# Patient Record
Sex: FEMALE | Race: WHITE
[De-identification: names, ages, dates, MRNs, and addresses within clinical notes are randomized per-mention and may not be internally consistent; named-entity substitution may affect disease eponyms.]

---

## 2017-03-30 ENCOUNTER — HOSPITAL ENCOUNTER (INPATIENT)
Dept: HOSPITAL 80 - CED | Age: 77
LOS: 2 days | Discharge: HOME | DRG: 247 | End: 2017-04-01
Attending: INTERNAL MEDICINE | Admitting: INTERNAL MEDICINE
Payer: COMMERCIAL

## 2017-03-30 DIAGNOSIS — Z85.3: ICD-10-CM

## 2017-03-30 DIAGNOSIS — Z86.73: ICD-10-CM

## 2017-03-30 DIAGNOSIS — I10: ICD-10-CM

## 2017-03-30 DIAGNOSIS — F41.8: ICD-10-CM

## 2017-03-30 DIAGNOSIS — I25.790: Primary | ICD-10-CM

## 2017-03-30 DIAGNOSIS — I25.2: ICD-10-CM

## 2017-03-30 DIAGNOSIS — I48.0: ICD-10-CM

## 2017-03-30 DIAGNOSIS — K21.9: ICD-10-CM

## 2017-03-30 DIAGNOSIS — I48.92: ICD-10-CM

## 2017-03-30 DIAGNOSIS — E78.5: ICD-10-CM

## 2017-03-30 LAB
% IMMATURE GRANULYOCYTES: 0.3 % (ref 0–1.1)
ABSOLUTE IMMATURE GRANULOCYTES: 0.02 10^3/UL (ref 0–0.1)
ABSOLUTE NRBC COUNT: 0 10^3/UL (ref 0–0.01)
ADD DIFF?: NO
ADD MORPH?: NO
ADD SCAN?: NO
ALBUMIN SERPL-MCNC: 3.9 G/DL (ref 3.5–5)
ALP SERPL-CCNC: 79 IU/L (ref 38–126)
ALT SERPL-CCNC: 34 IU/L (ref 9–52)
ANION GAP SERPL CALC-SCNC: 13 MEQ/L (ref 8–16)
AST SERPL-CCNC: 24 IU/L (ref 14–46)
ATYPICAL LYMPHOCYTE FLAG: 0 (ref 0–99)
BILIRUB SERPL-MCNC: 0.4 MG/DL (ref 0.1–1.4)
CALCIUM SERPL-MCNC: 9.1 MG/DL (ref 8.5–10.4)
CHLORIDE SERPL-SCNC: 104 MEQ/L (ref 97–110)
CO2 SERPL-SCNC: 22 MEQ/L (ref 22–31)
CREAT SERPL-MCNC: 0.8 MG/DL (ref 0.6–1)
ERYTHROCYTE [DISTWIDTH] IN BLOOD BY AUTOMATED COUNT: 13.5 % (ref 11.5–15.2)
FRAGMENT RBC FLAG: 0 (ref 0–99)
GFR SERPL CREATININE-BSD FRML MDRD: > 60 ML/MIN/{1.73_M2}
GLUCOSE SERPL-MCNC: 86 MG/DL (ref 70–100)
HCT VFR BLD CALC: 38.9 % (ref 38–47)
HGB BLD-MCNC: 13.3 G/DL (ref 12.6–16.3)
LEFT SHIFT FLG: 0 (ref 0–99)
LIPEMIA HEMOLYSIS FLAG: 90 (ref 0–99)
MCH RBC BLDCO QN: 32.7 PG (ref 27.9–34.1)
MCHC RBC AUTO-ENTMCNC: 34.2 G/DL (ref 32.4–36.7)
MCV RBC AUTO: 95.6 FL (ref 81.5–99.8)
NRBC-AUTO%: 0 % (ref 0–0.2)
PLATELET # BLD: 261 10^3/UL (ref 150–400)
PLATELET CLUMPS FLAG: 0 (ref 0–99)
PMV BLD AUTO: 10.4 FL (ref 8.7–11.7)
POTASSIUM SERPL-SCNC: 4.2 MEQ/L (ref 3.5–5.2)
PROT SERPL-MCNC: 7.1 G/DL (ref 6.3–8.2)
RBC # BLD AUTO: 4.07 10^6/UL (ref 4.18–5.33)
SODIUM SERPL-SCNC: 139 MEQ/L (ref 134–144)
TROPONIN I SERPL-MCNC: < 0.012 NG/ML (ref 0–0.03)

## 2017-03-30 PROCEDURE — C1725 CATH, TRANSLUMIN NON-LASER: HCPCS

## 2017-03-30 PROCEDURE — C1760 CLOSURE DEV, VASC: HCPCS

## 2017-03-30 PROCEDURE — C9600 PERC DRUG-EL COR STENT SING: HCPCS

## 2017-03-30 PROCEDURE — C1887 CATHETER, GUIDING: HCPCS

## 2017-03-30 PROCEDURE — C1769 GUIDE WIRE: HCPCS

## 2017-03-30 PROCEDURE — C1874 STENT, COATED/COV W/DEL SYS: HCPCS

## 2017-03-30 RX ADMIN — ASPIRIN SCH MG: 81 TABLET, DELAYED RELEASE ORAL at 21:34

## 2017-03-30 RX ADMIN — ATORVASTATIN CALCIUM SCH MG: 40 TABLET, FILM COATED ORAL at 21:34

## 2017-03-30 RX ADMIN — Medication SCH MG: at 21:32

## 2017-03-30 RX ADMIN — CARVEDILOL SCH MG: 6.25 TABLET, FILM COATED ORAL at 21:33

## 2017-03-30 RX ADMIN — RAMIPRIL SCH MG: 2.5 CAPSULE ORAL at 21:34

## 2017-03-30 NOTE — CPEKG
Heart Rate: 66

RR Interval: 909

P-R Interval: 175

QRSD Interval: 82

QT Interval: 392

QTC Interval: 411

P Axis: -25

QRS Axis: 33

T Wave Axis: 21

EKG Severity - ABNORMAL ECG -

EKG Impression: SINUS RHYTHM

EKG Impression: ANTERIOR INFARCT, OLD

Electronically Signed By: Rodrigo Tang 30-Mar-2017 14:58:52

## 2017-03-30 NOTE — EDPHY
H & P


Chief Complaint Nursing Narrative: intermittent chest discomfort described as " 

indigestion" with l shoulder pain that radiates to neck and sob x 1 week


Time Seen by Provider: 03/30/17 14:09


HPI/ROS: 





Chief Complaint:  Chest pain





HPI:  76-year-old woman with past medical history of coronary artery disease, 

CVA and TIA is presenting complaining of "indigestion" for the last week and a 

half.  Patient states she has been having daily discomfort in her chest which 

is radiating to her left arm and to her left neck.  She states this does come 

and go and goes away with " a large amount" of antacids.  It does feel somewhat 

similar to when she had her prior MI.  Has not discussed this with her 

cardiologist, Dr. Mccray, or her primary care physician, Dr. Camilla Smith.  It is 

waxing waning.  At worst it is a 4/10.  If your Urgent Care today it is 0/10.  

She has had some nausea with no vomiting. No numbness or weakness. Has had some 

dyspnea on exertion.





ROS:  10 point Review of Systems is negative except as noted in the HPI.





PMH:  Coronary artery disease status post CABG, CVA x1, TIA x1, breast cancer 

status post lumpectomy, lymphoma





Medications:  Carvedilol, digoxin, Lipitor, omeprazole, aspirin, Wellbutrin, 

ramipril, Klonopin





Allergies:  Sulfa





Social History: No smoking, no alcohol,  no recreational drug use





Family History: non-contributory





Physical Exam:


Gen: Awake, Alert, No Distress


HEENT:  


     Nose: no rhinorrhea


     Eyes: PERRLA, EOMI


     Mouth: Moist mucosa 


Neck: Supple, no JVD


Chest: nontender, lungs clear to auscultation


Heart: S1, S2 normal, no murmur


Abd: Soft, non-tender, no guarding


Back: no CVA tenderness, no midline tenderness 


Ext: no edema, non-tender


Skin: no rash


Neuro: CN II-XII intact, Sensation grossly intact, Strength 5/5 in bilateral 

upper and lower extremities








- Personal History


Tetanus Vaccine Date: < 10





- Medical/Surgical History


Hx Asthma: No


Hx Chronic Respiratory Disease: No


Hx Diabetes: No


Hx Cardiac Disease: Yes


Hx Renal Disease: No


Hx Cirrhosis: No


Hx Alcoholism: No


Hx HIV/AIDS: No


Hx Splenectomy or Spleen Trauma: No


Other PMH: cardiac issues





- Social History


Smoking Status: Never smoked


Constitutional: 


 Initial Vital Signs











Temperature (C)  37.1 C   03/30/17 14:10


 


Heart Rate  70   03/30/17 14:10


 


Respiratory Rate  18   03/30/17 14:10


 


Blood Pressure  145/83 H  03/30/17 14:10


 


O2 Sat (%)  92   03/30/17 14:10








 











O2 Delivery Mode               Room Air














Allergies/Adverse Reactions: 


 





Sulfa (Sulfonamide Antibiotics) Allergy (Intermediate, Verified 05/29/14 12:10)


 








Home Medications: 














 Medication  Instructions  Recorded


 


Aspirin [Aspirin 81mg (OTC)] 81 mg PO DAILY 04/14/12


 


Carvedilol [Coreg (RX)] 3.125 mg PO BIDMEAL 04/14/12


 


Ramipril [Altace 2.5mg (RX)] 2.5 mg PO HS 04/14/12


 


clonazePAM [Klonopin (RX)] 0.5 mg PO HS 04/14/12


 


Atorvastatin Calcium [Lipitor 40 40 mg PO DAILY 11/11/13





mg (RX)]  


 


Bupropion HCl [Budeprion Sr] 300 mg PO HS 11/11/13


 


Digoxin [Lanoxin] 0.125 mg PO DAILY 11/11/13


 


Melatonin [Melatonin 3 MG (OTC)] 3 mg PO HS 11/11/13


 


Omeprazole [Prilosec 20 mg] 20 mg PO DAILY PRN 11/11/13














Medical Decision Making





- Diagnostics


EKG Interpretation: 





ECG:  Time 2:14 p.m., sinus rhythm with a rate of 66, normal axis, Q-waves in 

V1 through V3 consistent with an old anterior infarct, no acute ST or T-wave 

changes.  Impression:  Old infarct, no acute


Imaging: 





Chest x-ray:  Negative per Dr. Oppenheimer.


ED Course/Re-evaluation: 





76-year-old with multiple risk factors for coronary disease with a history of 

CABG in the past.  Patient has no acute changes on ECG today troponin is 0. 

However given her significant risk factors in inability to recognize the 

possibility of this as cardiac she will require admit hospital admission for 

further evaluation.  I have discussed with Dr. Solano, hospitalist.  She will 

except the patient to the PCU for further evaluation.





- Data Points


Laboratory Results: 


 Laboratory Results





 03/30/17 14:30 





 03/30/17 14:30 





 











  03/30/17 03/30/17





  14:30 14:30


 


WBC    6.73 10^3/uL 10^3/uL





    (3.80-9.50) 


 


RBC    4.07 10^6/uL L 10^6/uL





    (4.18-5.33) 


 


Hgb    13.3 g/dL g/dL





    (12.6-16.3) 


 


Hct    38.9 % %





    (38.0-47.0) 


 


MCV    95.6 fL fL





    (81.5-99.8) 


 


MCH    32.7 pg pg





    (27.9-34.1) 


 


MCHC    34.2 g/dL g/dL





    (32.4-36.7) 


 


RDW    13.5 % %





    (11.5-15.2) 


 


Plt Count    261 10^3/uL 10^3/uL





    (150-400) 


 


MPV    10.4 fL fL





    (8.7-11.7) 


 


Neut % (Auto)    60.3 % %





    (39.3-74.2) 


 


Lymph % (Auto)    24.4 % %





    (15.0-45.0) 


 


Mono % (Auto)    12.2 % %





    (4.5-13.0) 


 


Eos % (Auto)    2.4 % %





    (0.6-7.6) 


 


Baso % (Auto)    0.4 % %





    (0.3-1.7) 


 


Nucleat RBC Rel Count    0.0 % %





    (0.0-0.2) 


 


Absolute Neuts (auto)    4.06 10^3/uL 10^3/uL





    (1.70-6.50) 


 


Absolute Lymphs (auto)    1.64 10^3/uL 10^3/uL





    (1.00-3.00) 


 


Absolute Monos (auto)    0.82 10^3/uL H 10^3/uL





    (0.30-0.80) 


 


Absolute Eos (auto)    0.16 10^3/uL 10^3/uL





    (0.03-0.40) 


 


Absolute Basos (auto)    0.03 10^3/uL 10^3/uL





    (0.02-0.10) 


 


Absolute Nucleated RBC    0.00 10^3/uL 10^3/uL





    (0-0.01) 


 


Immature Gran %    0.3 % %





    (0.0-1.1) 


 


Immature Gran #    0.02 10^3/uL 10^3/uL





    (0.00-0.10) 


 


Sodium  139 mEq/L mEq/L  





   (134-144)  


 


Potassium  4.2 mEq/L mEq/L  





   (3.5-5.2)  


 


Chloride  104 mEq/L mEq/L  





   ()  


 


Carbon Dioxide  22 mEq/l mEq/l  





   (22-31)  


 


Anion Gap  13 mEq/L mEq/L  





   (8-16)  


 


BUN  13 mg/dL mg/dL  





   (7-23)  


 


Creatinine  0.8 mg/dL mg/dL  





   (0.6-1.0)  


 


Estimated GFR  > 60   





   


 


Glucose  86 mg/dL mg/dL  





   ()  


 


Calcium  9.1 mg/dL mg/dL  





   (8.5-10.4)  


 


Total Bilirubin  0.4 mg/dL mg/dL  





   (0.1-1.4)  


 


AST  24 IU/L IU/L  





   (14-46)  


 


ALT  34 IU/L IU/L  





   (9-52)  


 


Alkaline Phosphatase  79 IU/L IU/L  





   ()  


 


Troponin I  < 0.012 ng/mL ng/mL  





   (0-0.034)  


 


Total Protein  7.1 g/dL g/dL  





   (6.3-8.2)  


 


Albumin  3.9 g/dL g/dL  





   (3.5-5.0)  














Departure





- Departure


Disposition: [facility] Inpatient Acute


Clinical Impression: 


 Chest pain





Condition: Fair


Referrals: 


Camilla Leyva MD [Primary Care Provider] - As per Instructions

## 2017-03-31 PROCEDURE — 027034Z DILATION OF CORONARY ARTERY, ONE ARTERY WITH DRUG-ELUTING INTRALUMINAL DEVICE, PERCUTANEOUS APPROACH: ICD-10-PCS | Performed by: INTERNAL MEDICINE

## 2017-03-31 RX ADMIN — ASPIRIN SCH MG: 81 TABLET, DELAYED RELEASE ORAL at 08:40

## 2017-03-31 RX ADMIN — RAMIPRIL SCH MG: 2.5 CAPSULE ORAL at 20:25

## 2017-03-31 RX ADMIN — NITROGLYCERIN SCH: 20 OINTMENT TOPICAL at 23:45

## 2017-03-31 RX ADMIN — THERA TABS SCH EACH: TAB at 08:39

## 2017-03-31 RX ADMIN — NITROGLYCERIN SCH: 20 OINTMENT TOPICAL at 02:40

## 2017-03-31 RX ADMIN — NITROGLYCERIN SCH INCH: 20 OINTMENT TOPICAL at 05:45

## 2017-03-31 RX ADMIN — NITROGLYCERIN SCH INCH: 20 OINTMENT TOPICAL at 17:17

## 2017-03-31 RX ADMIN — NITROGLYCERIN SCH: 20 OINTMENT TOPICAL at 18:17

## 2017-03-31 RX ADMIN — ATORVASTATIN CALCIUM SCH MG: 40 TABLET, FILM COATED ORAL at 08:40

## 2017-03-31 RX ADMIN — Medication SCH MG: at 20:25

## 2017-03-31 RX ADMIN — ENOXAPARIN SODIUM SCH MG: 100 INJECTION SUBCUTANEOUS at 08:38

## 2017-03-31 NOTE — HOSPPROG
Hospitalist Progress Note


Assessment/Plan: 





DIAGNOSES: 


-unstable angina pectoralis


-coronary artery stenosis at obtuse marginal branch, now SPECT status post 

stent which is her 1st stent


-history of bypass surgery 1994, had presented with chest pain and cardiac 

arrest at that time


-no evidence of DVT on sono





PLANS:


-monitor overnight for stability; therefore will need to change to inpt


-Plavix will be added


-anticipate likely discharge tomorrow





SUBJECTIVE:


Seen after angiography and stent placement


Feels well with no pain at access site, no angina, no shortness of breath or 

nausea





OBJECTIVE


Vitals reviewed:  stable without fever


Cardiac Monitor, my review: sinus rhythm





Exam:


alert oriented 


skin warm dry color ok


resps not labored


lungs clear BSs


heart regular


abd soft nondistended nontender, bowel sounds present


limbs warm, no edema


iv site ok








Objective: 


 Vital Signs











Temp Pulse Resp BP Pulse Ox


 


 37.0 C   68   13   158/97 H  94 


 


 03/31/17 15:49  03/31/17 15:49  03/31/17 15:49  03/31/17 15:49  03/31/17 15:49








 











 03/30/17 03/31/17 04/01/17





 06:59 06:59 06:59


 


Intake Total  300 


 


Balance  300 














ICD10 Worksheet


Patient Problems: 


 Problems











Problem Status Onset


 


Chest pain Acute  


 


TIA (transient ischemic attack) Acute

## 2017-03-31 NOTE — CPEKG
Heart Rate: 63

RR Interval: 952

P-R Interval: 248

QRSD Interval: 82

QT Interval: 404

QTC Interval: 414

P Axis: 87

QRS Axis: 55

T Wave Axis: 25

EKG Severity - ABNORMAL ECG -

EKG Impression: SINUS RHYTHM

EKG Impression: FIRST DEGREE AV BLOCK

EKG Impression: LOW VOLTAGE THROUGHOUT

EKG Impression: CONSIDER ANTEROSEPTAL INFARCT

EKG Impression: No significant change from March 31, 2017, 8:59

Electronically Signed By: Inder Wheat 31-Mar-2017 16:42:24

## 2017-03-31 NOTE — PDGENHP
History and Physical


History and Physical: 


HISTORY AND PHYSICAL





CC:  Chest pain





HISTORY:


This patient with a history of bypass surgery in 1994 comes in complaining of 

anginal symptoms.  She states that over the last 10 days she has had frequent 

episodes of a squeezing discomfort in the substernal area radiating up to the 

left neck and shoulder.  She describes this as an indigestion.  This is the 

same location and quality of pain that she recalls from 1994 and it sounds very 

much like description of her symptoms in her chart from that time.  She does 

not notice that exertion eating position or anything else in particular is 

bringing on these episodes.  The episodes are lasting 5-10 minutes and 

occurring at least once a day.  There is some nausea and mild dyspnea that goes 

with that.  Otherwise she does not have any new exertional dyspnea, orthopnea, 

palpitations, fever.  She does notice that her calfs are both a bit sore.





She is taking her cholesterol treatment, she does not smoke, she has not 

traveled.  She is quite sedentary though.








ROS:  A comprehensive 10 system review revealed no other significant findings








PAST MEDICAL HISTORY:


Coronary artery bypass surgery 1994


Acute myocardial infarction with cardiac arrest in 1994


Hypertension


Anxiety disorder


Breast cancer with lumpectomy and radiation





FAMILY MEDICAL HISTORY:


Coronary disease in both her parents with myocardial infarctions





SOCIAL HISTORY:


 lives with her 


They apparently run some type of  for  children and they have 5 

kids who are present during the week


She admits to a severe degree of stress lately.  Apparently she has an adopted 

14-year-old son who has run from home and apparently is making some kind of 

suicidal threats recently





MEDICATIONS:


The patient's home medication list has been reconciled by our clinical 

pharmacist in the EMR.  I have reviewed the list and ordered appropriate 

medicines.





PHYSICAL EXAMINATION:


Vital Signs:  Stable without fever


Cardiac Monitor:Sinus





Examination:


General: alert, oriented, good mentation, relaxed


Skin: warm, dry, good color, no rash


HEENT: normal


Neck: no mass or jvd


Resps: relaxed


Lungs: clear breath sounds


Heart: regular, no murmur


Abdomen: soft, nondistended, nontender, +BS, no mass


Upper Extremities: normal


Lower Extremities: no edema, warm


No Bleeding or bruising


Neurologic: normal speech/language, normal CNs, no focal weakness


IV site: looks normal








LABORATORY DATA:


1st troponin normal otherwise unremarkable





12 LEAD EKG, MY PERSONAL READING OF THE TRACING:  Sinus rhythm with no acute 

ischemic abnormalities





RADIOLOGY STUDIES:


My personal interpretation:  Status post mastectomy right otherwise unremarkable





ASSESSMENT:


-UNSTABLE ANGINA PECTORALIS IN A PATIENT WITH BYPASS SURGERY AND CARDIAC ARREST 

IN 1994, HISTORY OF RADIATION FOR BREAST CANCER


-BILATERAL CALF DISCOMFORT WITH UNREMARKABLE EXAMINATION





PLANS:


-admit to cardiac monitor unit, NPO after midnight


-continue her usual medicines


-will give some nitroglycerin overnight to keep things quiet


-coronary angiography in the morning


-Will hold her digoxin at this point as there does not appear to be any reason 

for her to be taking that, and Dr. Recinos is recommending that we discontinue 

that as she goes home


-Doppler ultrasound of both legs








I have reviewed the patient's case in detail with Dr. devang Recinos 

and Ricky Wong


I have reviewed the patient's past medical records as part of this assessment, 

including previous hospital records from this hospital and clinic records

## 2017-03-31 NOTE — GCON
[f rep st]



                                                                    CONSULTATION





CARDIOLOGY CONSULTATION



DATE OF CONSULTATION:  03/31/2017



CHIEF COMPLAINT:  Intermittent chest and shoulder pain.



HISTORY OF PRESENT ILLNESS:  This is a very pleasant 76-year-old female who is followed by Dr. Andreina garnica in our office.  Her cardiac history dates back to 1994 when she had a cardiac arrest and underwent
 apparently 2-vessel coronary bypass grafting by Dr. Bebo Wallace.  She was being seen by a cardio
logist in [place].  Apparently had a "normal exercise stress test within the last year."  For the las
t 2 weeks, however, she has been having increasing indigestion, which radiates to her neck and left 
shoulder pain.  Her initial presentation of coronary artery disease was thought to be neck and shoul
shirley pain as well.  She is active.  She is under quite a bit of stress at her house.  She has been ta
yefri antacids with some relief.  She denies any active GI blood loss.  She was admitted last night a
nd has had EKGs, which were nondiagnostic, and normal troponins.  She is pain-free and doing well th
is morning.  I had a long talk with her and her  about her options of cardiac catheterization
 versus nuclear stress testing.  We went through the pros and cons.  At this point, she has agreed t
o cardiac catheterization.  She had no prior issues with her cardiac catheterization, such as allerg
ies or other issues.  She does understand that this will be the test that will give us the most defi
nitive information, and if need be, stenting can be done at the same time.  She is comfortable and a
greeable to that.



PAST MEDICAL HISTORY:  Coronary artery disease, history of TIAs, status post breast cancer and lumpe
ctomy.



MEDICATIONS:  Include carvedilol, digoxin, Lipitor, omeprazole, aspirin, Wellbutrin, ramipril, and K
lonopin.



ALLERGIES:  Sulfa.



SOCIAL HISTORY:  No alcohol, no cigarettes.  She does have quite a bit of stress with caring for a f
ju child.



EXAM:  VITAL SIGNS:  Blood pressure is 100/70, with a heart rate of 70. GENERAL:  She is a middle-ag
ed, elderly female who is comfortable lying in bed.  MOUTH:  Oropharynx was moist.  BACK, CV, CHEST:
  All without palpable tenderness.  LUNGS:  Clear to auscultation.  CARDIOVASCULAR:  Regular rate an
d rhythm, without murmurs, gallops, or rub.  ABDOMEN:  Soft, nontender.  Normoactive bowel sounds.  
MUSCULOSKELETAL:  Without cyanosis, clubbing, or edema.



LABS:  Potassium 4.2, creatinine 0.8.  Troponin is normal.  White count 6.7, hemoglobin 13.  EKG jame
ws a normal sinus rhythm, without acute ST-T wave changes.



ASSESSMENT:  

1.  Two-week history of increasing chest discomfort with radiation to the neck and jaw.  This very w
ell could be GI; however, she has significant cardiac history in the past, including cardiac arrest.
  She may have had a normal treadmill within the last year, but these symptoms are new.  She is hemo
dynamically stable, with negative EKG and enzymes and a normal exam today.  She has agreed to cardia
c catheterization intervention if necessary.  This will be done this morning.  Hopefully, if this sh
ows no issues, she could be discharged later today for GI outpatient followup.  If she needs a stent
, she has agreed to this.  Her , who is in the exam room, was spoken to as well, and he agree
s.  In reviewing her meds, she has been on digitalis for quite some time of unknown reasons, and I m
ay very well stop that drug as well.  

2.  Gastroesophageal reflux disease.  No active gastrointestinal bleeding.  

3.  Life stressors.  Could be a contributing factor.  



Case discussed with Dr. Cochran.





Job #:  [id number]

## 2017-03-31 NOTE — CPEKG
Heart Rate: 66

RR Interval: 909

P-R Interval: 212

QRSD Interval: 84

QT Interval: 408

QTC Interval: 428

P Axis: 65

QRS Axis: 62

T Wave Axis: 55

EKG Severity - ABNORMAL ECG -

EKG Impression: SINUS RHYTHM

EKG Impression: CONSIDER ANTEROSEPTAL INFARCT

EKG Impression: No significant change from March 30, 2017

Electronically Signed By: Inder Wheat 31-Mar-2017 12:54:04

## 2017-03-31 NOTE — PDCTREPORT
Cardiothoracic Procedure Rpt


Cardiothoracic Procedure Report: 


Procedure:  PCI stenting of the obtuse marginal branch.


After reviewing diagnostic angiograms by my partner Dr. Ricky Wong it was 

elected to proceed with PCI stenting of the obtuse marginal branch.  Patient 

was anticoagulated.  Therapeutic ACT was confirmed.  A 6 Macanese EBU 3.75 

guiding catheter was used to intubate the left main coronary artery. A 0.014 cm 

SAmurai wire was used to cross the obtuse marginal stenosis.  It was primarily 

stented with a 2.25 x 12 mm synergy stent.  Repeat angiogram showed ARMANDO grade 

3 flow.


Successful PCI and stenting of the obtuse marginal branch.








Patient Problems: 


 Problems











Problem Status Onset


 


TIA (transient ischemic attack) Acute  


 


Chest pain Acute

## 2017-04-01 VITALS
DIASTOLIC BLOOD PRESSURE: 71 MMHG | SYSTOLIC BLOOD PRESSURE: 120 MMHG | TEMPERATURE: 95.9 F | HEART RATE: 71 BPM | OXYGEN SATURATION: 95 %

## 2017-04-01 VITALS — RESPIRATION RATE: 16 BRPM

## 2017-04-01 LAB
% IMMATURE GRANULYOCYTES: 0.4 % (ref 0–1.1)
ABSOLUTE IMMATURE GRANULOCYTES: 0.04 10^3/UL (ref 0–0.1)
ABSOLUTE NRBC COUNT: 0 10^3/UL (ref 0–0.01)
ADD DIFF?: NO
ADD MORPH?: NO
ADD SCAN?: NO
ALBUMIN SERPL-MCNC: 4 G/DL (ref 3.5–5)
ANION GAP SERPL CALC-SCNC: 11 MEQ/L (ref 8–16)
AST SERPL-CCNC: 24 IU/L (ref 14–46)
ATYPICAL LYMPHOCYTE FLAG: 0 (ref 0–99)
BILIRUB SERPL-MCNC: 0.7 MG/DL (ref 0.1–1.4)
CALCIUM SERPL-MCNC: 9 MG/DL (ref 8.5–10.4)
CHLORIDE SERPL-SCNC: 106 MEQ/L (ref 97–110)
CO2 SERPL-SCNC: 20 MEQ/L (ref 22–31)
CREAT SERPL-MCNC: 0.8 MG/DL (ref 0.6–1)
ERYTHROCYTE [DISTWIDTH] IN BLOOD BY AUTOMATED COUNT: 13.2 % (ref 11.5–15.2)
FRAGMENT RBC FLAG: 0 (ref 0–99)
GFR SERPL CREATININE-BSD FRML MDRD: > 60 ML/MIN/{1.73_M2}
GLUCOSE SERPL-MCNC: 105 MG/DL (ref 70–100)
HCT VFR BLD CALC: 36.6 % (ref 38–47)
HGB BLD-MCNC: 12.4 G/DL (ref 12.6–16.3)
LDH SERPL-CCNC: 477 IU/L (ref 313–618)
LEFT SHIFT FLG: 0 (ref 0–99)
LIPEMIA HEMOLYSIS FLAG: 90 (ref 0–99)
MAGNESIUM SERPL-MCNC: 2.1 MG/DL (ref 1.6–2.3)
MCH RBC BLDCO QN: 32.5 PG (ref 27.9–34.1)
MCHC RBC AUTO-ENTMCNC: 33.9 G/DL (ref 32.4–36.7)
MCV RBC AUTO: 96.1 FL (ref 81.5–99.8)
NRBC-AUTO%: 0 % (ref 0–0.2)
PLATELET # BLD: 219 10^3/UL (ref 150–400)
PLATELET CLUMPS FLAG: 0 (ref 0–99)
PMV BLD AUTO: 10.6 FL (ref 8.7–11.7)
POTASSIUM SERPL-SCNC: 4.3 MEQ/L (ref 3.5–5.2)
RBC # BLD AUTO: 3.81 10^6/UL (ref 4.18–5.33)
SODIUM SERPL-SCNC: 137 MEQ/L (ref 134–144)

## 2017-04-01 RX ADMIN — THERA TABS SCH EACH: TAB at 09:32

## 2017-04-01 RX ADMIN — ATORVASTATIN CALCIUM SCH MG: 40 TABLET, FILM COATED ORAL at 09:32

## 2017-04-01 RX ADMIN — ENOXAPARIN SODIUM SCH MG: 100 INJECTION SUBCUTANEOUS at 09:32

## 2017-04-01 RX ADMIN — NITROGLYCERIN SCH: 20 OINTMENT TOPICAL at 05:16

## 2017-04-01 NOTE — GDS
[f 
rep st]



                                                             DISCHARGE SUMMARY





DISCHARGE DIAGNOSES:  

1.  New onset chest pain, concerning for acute coronary syndrome, status post 
left heart catheterization and PTCA and stenting to principal OM.

2.  History of coronary artery disease with CABG in 1994, preceded by cardiac 
arrest.

3.  Paroxysmal atrial flutter seen on this admission.

4.  History of hypertension.

5.  History of dyslipidemia.

6.  History of anxiety and depression.



PROCEDURES:  On 03/31/2017, left heart catheterization with percutaneous 
intervention to obtuse marginal stenosis with a 2.25 x 12 mm Synergy stent.



BRIEF HISTORY:  Please see dictated H and P for complete details.  In brief, 
the patient is a 76-year-old female with dyslipidemia, hypertension, CAD with 
CABG in 1994, who presented with 2 weeks of worsening indigestion symptoms 
which radiated into her neck and left shoulder.  She had been taking some 
antacids with some relief.  However, her home situation became more and more 
stressful due to problems with an adopted child.  She had a bout of symptoms 
that were not relieved, and therefore she presented to the emergency department 
for further evaluation.  She was found to have negative troponins and an 
unchanged ECG.  Options were reviewed and patient agreed to cardiac 
catheterization for further evaluation.  She was found to have a severe 
blockage in an obtuse marginal vessel.  This was treated with PTCA and 
stenting. 



On day of discharge, patient denies any further episodes of chest pain.  On 
telemetry monitoring, she was found to have some short bouts of what appears to 
be atrial flutter versus SVT.



HOSPITAL COURSE:  

1.  Chest pain.  She had escalating symptoms concerning for ACS.  She has 
proceeded to PCI at this time.

2.  Atrial flutter versus SVT.  We will plan for a 1-week monitor to evaluate 
this further.  She will follow up with Dr. Mccray in 1 month's time.

3.  Dyslipidemia.  Her home atorvastatin has been continued.

4.  Hypertension.  Blood pressures have been moderately elevated in this 
admission.  Her home carvedilol has been increased.



RESULTS PENDING:  None.



DIET:  Cardiac.



ACTIVITY:  Groin precautions were reviewed.



DISCHARGE MEDICATIONS:  Please see medication reconciliation for complete 
details.  She is being discharged on all her home medications, which include 
multivitamin, Klonopin, Lexapro, ramipril, omeprazole, melatonin, and 
atorvastatin.  She has been started on clopidogrel 75 mg p.o. daily.  Aspirin 
dose will be unchanged at 81 mg p.o. daily.  Her carvedilol dose was increased 
to 12.5 mg p.o. b.i.d.



DISCHARGE INSTRUCTIONS:  

1.  Groin precautions.

2.  Follow up with Dr. Mccray as scheduled.



Please note that greter than 30 minutes was spent on discharge and coordination 
of care.





Job #:  [id number]

MTDD

## 2017-04-01 NOTE — CPEKG
Heart Rate: 71

RR Interval: 845

P-R Interval: 196

QRSD Interval: 90

QT Interval: 428

QTC Interval: 466

P Axis: 34

QRS Axis: 47

T Wave Axis: 29

EKG Severity - ABNORMAL ECG -

EKG Impression: SINUS RHYTHM

EKG Impression: ATRIAL PREMATURE COMPLEX

EKG Impression: CONSIDER ANTEROSEPTAL INFARCT

EKG Impression: AGREE WITH ABOVE. NO SIGNIFICANT CHANGE SINCE MARCH 31, 2017

Electronically Signed By: Inder Wheat 01-Apr-2017 08:29:51

## 2017-04-14 ENCOUNTER — HOSPITAL ENCOUNTER (OUTPATIENT)
Dept: HOSPITAL 80 - FIMAGING | Age: 77
End: 2017-04-14
Attending: INTERNAL MEDICINE
Payer: COMMERCIAL

## 2017-04-14 DIAGNOSIS — Z78.0: ICD-10-CM

## 2017-04-14 DIAGNOSIS — Z13.820: Primary | ICD-10-CM

## 2017-04-14 DIAGNOSIS — M85.80: ICD-10-CM

## 2017-04-14 PROCEDURE — 77080 DXA BONE DENSITY AXIAL: CPT

## 2017-05-19 ENCOUNTER — HOSPITAL ENCOUNTER (OUTPATIENT)
Dept: HOSPITAL 80 - CIMAGING | Age: 77
End: 2017-05-19
Attending: INTERNAL MEDICINE
Payer: COMMERCIAL

## 2017-05-19 DIAGNOSIS — R05: Primary | ICD-10-CM

## 2017-07-03 ENCOUNTER — HOSPITAL ENCOUNTER (EMERGENCY)
Dept: HOSPITAL 80 - CED | Age: 77
Discharge: HOME | End: 2017-07-03
Payer: COMMERCIAL

## 2017-07-03 VITALS
OXYGEN SATURATION: 95 % | TEMPERATURE: 98.1 F | DIASTOLIC BLOOD PRESSURE: 78 MMHG | RESPIRATION RATE: 16 BRPM | SYSTOLIC BLOOD PRESSURE: 123 MMHG | HEART RATE: 104 BPM
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
## 2017-07-03 DIAGNOSIS — S70.02XA: ICD-10-CM

## 2017-07-03 DIAGNOSIS — S92.352A: Primary | ICD-10-CM
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
## 2017-07-03 DIAGNOSIS — Z79.82: ICD-10-CM

## 2017-07-03 PROCEDURE — 99284 EMERGENCY DEPT VISIT MOD MDM: CPT

## 2017-07-03 PROCEDURE — 73502 X-RAY EXAM HIP UNI 2-3 VIEWS: CPT

## 2017-07-03 PROCEDURE — 73630 X-RAY EXAM OF FOOT: CPT

## 2017-07-03 PROCEDURE — L4386 NON-PNEUM WALK BOOT PRE CST: HCPCS

## 2017-07-03 NOTE — EDPHY
H & P


Time Seen by Provider: 07/03/17 11:26


HPI/ROS: 





This patient complains of left foot injury from a fall at home on June 27th, 6 

days prior to this visit.  This is her 1st visit see a physician.  She explains 

that she stepped over a foot half tall vertically placed board in her home that 

these as a child area from 1 room to another and she tripped and fell on the 

patio.  She does recall the exact way that she landed but she had immediate 

lateral foot paint and left lateral hip pain. She states that since that time 

the foot pain has remained moderate 5/10 worse with walking and she has 

persistent ecchymosis and swelling to the affected foot.  The pain is not 

improving.  Similarly, she reports moderate left hip pain at times is more 

severe and is currently 3/10.  She is still able to bear full weight on the 

affected lower extremity.  She denies any other injuries.





ROS:  Constitutional:  No complaints


Neuro:  No numbness or tingling.  No bowel or bladder incontinence


Musculoskeletal:  No midline back pain. She does complain of mild bilateral hip 

pain since the fall but is much more prominent on the left side.  No neck 

injury.


HEENT:  No head injury from the fall.


Integumentary:  No lacerations.  She does have bruising to the foot.


5 point ROS is otherwise negative





Past Medical/Surgical History: 





Review-noncontributory


Smoking Status: Never smoked


Physical Exam: 





Physical Exam


Vital signs are normal.


General:  No acute distress


HEENT:  Atraumatic.  


Neck:  Nontender


Eyes:  Pupils equal and react to light.  Extraocular motions are intact.


Lungs:  No respiratory distress.


Back:  No midline tenderness. No tenderness the sciatic notch


Cardiac:  Brisk capillary refill is intact throughout.  Pulses are 2+ and 

symmetric in the affected extremity.


Pelvis:  No tenderness with patient's by position with pressure on the anterior 

superior iliac crest sore on the pubic symphysis


Left hip:  Mild to moderate lateral tenderness at the greater trochanter.  

There is no foreshortening leg or malrotation.  She can't bear full weight on 

the affected extremity.  Right hip is nontender


Left foot:  Patient has ecchymosis swelling and tenderness to the dorsum of her 

forefoot laterally.  This extends the toes but there is no toe tenderness. No 

ankle swelling or tenderness.


All other extremities atraumatic


Skin:  No rash or pallor.


Neuro:  Alert and oriented x3 with no sensorimotor deficits in the affected 

extremity





Initial differential diagnosis:  Foot fracture, foot sprain, hip contusion, hip 

fracture


Constitutional: 


 Initial Vital Signs











Temperature (C)  36.7 C   07/03/17 11:24


 


Heart Rate  104 H  07/03/17 11:24


 


Respiratory Rate  16   07/03/17 11:24


 


Blood Pressure  123/78 H  07/03/17 11:24


 


O2 Sat (%)  95   07/03/17 11:24








 











O2 Delivery Mode               Room Air














Allergies/Adverse Reactions: 


 





Sulfa (Sulfonamide Antibiotics) Allergy (Intermediate, Verified 07/03/17 11:23)


 








Home Medications: 














 Medication  Instructions  Recorded


 


Ramipril [Altace 2.5mg (*)] 2.5 mg PO HS 04/14/12


 


clonazePAM [Klonopin (*)] 0.5 mg PO HS 04/14/12


 


Atorvastatin Calcium [Lipitor 40 40 mg PO DAILY 11/11/13





mg (*)]  


 


Melatonin [Melatonin 3 MG (*)] 3 mg PO HS 11/11/13


 


Omeprazole [Prilosec 20 mg] 20 mg PO DAILY PRN 11/11/13


 


Aspirin EC [Aspirin EC 81 mg (*)] 81 mg PO DAILY 03/30/17


 


Escitalopram Oxalate [Lexapro 10 10 mg PO DAILY 03/30/17





MG]  


 


Multivitamins [Multivitamin (*)] 1 each PO DAILY 03/30/17


 


Carvedilol [Coreg (*)] 12.5 mg PO DAILY #60 tab 04/01/17


 


Clopidogrel Bisulfate [Plavix (*)] 75 mg PO DAILY #30 tab 04/01/17


 


Multivitamins [Multivitamin (*)] 1 each PO DAILY #0 tab 04/01/17














MDM/Departure





- MDM


Diagnostics: 





Foot x-ray:  Mildly displaced distal 5th metatarsal fracture by my 

interpretation





Hip x-ray:  Negative for fracture by my interpretation


Imaging Results: 


 Imaging Impressions





Foot X-Ray  07/03/17 11:37


Impression: Subacute, angulated and slightly shortened distal fifth metatarsal 

fracture.


 


2. Left Hip


 


Technique:  AP pelvis and frog-leg left hip.


 


Clinical Indications:  Pain, fall on 6/27/2017


 


Findings:  No fracture.  Thickness of the joint is normal, without sclerosis or 

significant osteophytes. The SI joints pubic symphysis and hip joints remain 

normally aligned. Pelvic ring is intact. There is bilateral lower SI joint 

sclerosis area and


 


Impression: 1. Nothing acute or subacute identified.


2. Findings suggest prior sacroiliitis.








Hip X-Ray  07/03/17 11:38


Impression: Subacute, angulated and slightly shortened distal fifth metatarsal 

fracture.


 


2. Left Hip


 


Technique:  AP pelvis and frog-leg left hip.


 


Clinical Indications:  Pain, fall on 6/27/2017


 


Findings:  No fracture.  Thickness of the joint is normal, without sclerosis or 

significant osteophytes. The SI joints pubic symphysis and hip joints remain 

normally aligned. Pelvic ring is intact. There is bilateral lower SI joint 

sclerosis area and


 


Impression: 1. Nothing acute or subacute identified.


2. Findings suggest prior sacroiliitis.











Imaging: I viewed and interpreted images myself


ED Course/Re-evaluation: 





Patient is placed in a walker boot I counseled regarding her foot fracture.





Regarding her hip, think that she has a greater trochanter contusion and this 

may be triggering a bit of bursitis as well. Clinically and radiographically on 

appreciate evidence of pelvic fracture.  Clinically she does not have sciatica 

or radiculopathy.  No other concerning findings.





She will follow up with Podiatry for further evaluation of her foot fracture





- Depart


Disposition: Home, Routine, Self-Care


Clinical Impression: 


Fracture of 5th metatarsal


Qualifiers:


 Encounter type: initial encounter Fracture type: closed Fracture alignment: 

displaced Laterality: left Qualified Code(s): S92.352A - Displaced fracture of 

fifth metatarsal bone, left foot, initial encounter for closed fracture





Contusion, hip


Qualifiers:


 Encounter type: initial encounter Laterality: left Qualified Code(s): S70.02XA 

- Contusion of left hip, initial encounter





Condition: Good


Instructions:  Foot Fracture in Adults (ED)


Additional Instructions: 


Diagnoses:  1.  5th metatarsal foot fracture 2. Hip contusion





Plan:  Ibuprofen Tylenol for discomfort





Walker boot whenever your up and about





Call Dr. Barron-foot specialist arrange follow-up appointment for further 

evaluation of your foot fracture.





Return for any significant worsening despite the treatment plan


Referrals: 


Camilla Leyva MD [Primary Care Provider] - As per Instructions


Hattie Barron DPM [Doctor of Podiatric Medicine] - As per Instructions

## 2017-07-18 ENCOUNTER — HOSPITAL ENCOUNTER (OUTPATIENT)
Dept: HOSPITAL 80 - CIMAGING | Age: 77
End: 2017-07-18
Attending: INTERNAL MEDICINE
Payer: COMMERCIAL

## 2017-07-18 DIAGNOSIS — M89.38: ICD-10-CM

## 2017-07-18 DIAGNOSIS — M48.56XA: Primary | ICD-10-CM

## 2017-07-18 DIAGNOSIS — M43.16: ICD-10-CM

## 2017-09-30 ENCOUNTER — HOSPITAL ENCOUNTER (OUTPATIENT)
Dept: HOSPITAL 80 - FIMAGING | Age: 77
End: 2017-09-30
Attending: INTERNAL MEDICINE
Payer: COMMERCIAL

## 2017-09-30 DIAGNOSIS — M12.88: ICD-10-CM

## 2017-09-30 DIAGNOSIS — M47.896: Primary | ICD-10-CM

## 2017-09-30 DIAGNOSIS — M99.73: ICD-10-CM

## 2017-09-30 DIAGNOSIS — M48.06: ICD-10-CM

## 2017-09-30 DIAGNOSIS — M43.16: ICD-10-CM

## 2017-09-30 DIAGNOSIS — Z98.890: ICD-10-CM

## 2017-09-30 DIAGNOSIS — M47.895: ICD-10-CM

## 2018-10-30 ENCOUNTER — HOSPITAL ENCOUNTER (EMERGENCY)
Dept: HOSPITAL 80 - FED | Age: 78
Discharge: HOME | End: 2018-10-30
Payer: COMMERCIAL

## 2018-10-30 VITALS — DIASTOLIC BLOOD PRESSURE: 66 MMHG | SYSTOLIC BLOOD PRESSURE: 110 MMHG

## 2018-10-30 DIAGNOSIS — M25.531: ICD-10-CM

## 2018-10-30 DIAGNOSIS — M25.532: ICD-10-CM

## 2018-10-30 DIAGNOSIS — M25.522: ICD-10-CM

## 2018-10-30 DIAGNOSIS — M79.642: ICD-10-CM
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
## 2018-10-30 DIAGNOSIS — E86.9: ICD-10-CM

## 2018-10-30 DIAGNOSIS — M25.521: ICD-10-CM
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
## 2018-10-30 DIAGNOSIS — M79.641: ICD-10-CM

## 2018-10-30 DIAGNOSIS — S02.2XXA: Primary | ICD-10-CM

## 2018-10-30 LAB
INR PPP: 1.07 (ref 0.83–1.16)
PLATELET # BLD: 241 10^3/UL (ref 150–400)
PROTHROMBIN TIME: 14.1 SEC (ref 12–15)

## 2018-10-30 PROCEDURE — G0480 DRUG TEST DEF 1-7 CLASSES: HCPCS

## 2018-10-30 PROCEDURE — 73110 X-RAY EXAM OF WRIST: CPT

## 2018-10-30 PROCEDURE — 99285 EMERGENCY DEPT VISIT HI MDM: CPT

## 2018-10-30 PROCEDURE — 73130 X-RAY EXAM OF HAND: CPT

## 2018-10-30 PROCEDURE — 96376 TX/PRO/DX INJ SAME DRUG ADON: CPT

## 2018-10-30 PROCEDURE — 73080 X-RAY EXAM OF ELBOW: CPT

## 2018-10-30 PROCEDURE — 96374 THER/PROPH/DIAG INJ IV PUSH: CPT

## 2018-10-30 PROCEDURE — 96361 HYDRATE IV INFUSION ADD-ON: CPT

## 2018-10-30 PROCEDURE — 70450 CT HEAD/BRAIN W/O DYE: CPT

## 2018-10-30 PROCEDURE — 93005 ELECTROCARDIOGRAM TRACING: CPT

## 2018-10-30 RX ADMIN — HYDROMORPHONE HYDROCHLORIDE ONE MG: 2 INJECTION INTRAMUSCULAR; INTRAVENOUS; SUBCUTANEOUS at 11:43

## 2018-10-30 RX ADMIN — HYDROMORPHONE HYDROCHLORIDE ONE: 2 INJECTION INTRAMUSCULAR; INTRAVENOUS; SUBCUTANEOUS at 11:40

## 2018-10-30 NOTE — EDPHY
H & P


Time Seen by Provider: 10/30/18 09:55


HPI/ROS: 





HPI


Trip and fall.  Facial pain, bilateral hand pain.





78-year-old female by private vehicle with her  and granddaughter.  This 

patient was getting her dog out of the car to go to [employer] when she tripped and 

fell forward striking her face and bracing herself with both hands.  She 

complains of facial pain as well as pain to both hands, wrists and elbows.  She 

denies loss of consciousness.  She has had no nausea, vomiting or confusion.  

She denies significant headache.  She is on Plavix.  No neck pain.  No loss of 

sensation or weakness in her extremities.





ROS:





Constitutional:  No fever, no chills.  As above.


Eyes:  No discharge.  No changes in vision.


ENT:  No sore throat.  No nasal congestion or rhinorrhea.  Bloody nose.


Respiratory:  No cough.  No shortness of breath.


Cardiac:  No chest pain, no palpitations.


Gastrointestinal:  No abdominal pain, no vomiting, no diarrhea.


Genitourinary:  No hematuria.  No dysuria or increased frequency with urination.


Musculoskeletal:  No back pain.  No neck pain.  As above.


Skin:  No rashes.  Abrasion to lower lip.


Neurological:  No headache.  No focal weakness or altered sensation.





Past medical history:  MI, CABG x2 in 89, breast cancer, lymphoma, CVA, TIA, 

cholecystectomy, appendectomy, anxiety.





Social history:  Here with her  and grandson.  Nonsmoker.  No alcohol.





Physical Exam:





General Appearance:  Alert, no distress.  This patient is responding to 

questions appropriately and in full sentences.  This patient appears well-

hydrated and well-nourished.


Head:  Normocephalic atraumatic.  Except for superficial abrasion over the 

anterior mid nose and nasal bridge.  The nasal bones are intact.  She has got 

some dried blood in both nares.


Face:  Facial bones are stable on palpation.


Eyes:  Pupils equal and round and reactive to light, no pallor or injection.  

No lid erythema or edema.


ENT, Mouth:  Mucous membranes moist.  Poor dentition but dentition is intact.  

No malocclusion of the jaw.  No tongue lacerations or abrasions.  Pharynx is 

clear.  Some clotted blood in bilateral nasal canals.  No active hemorrhage.  

No septal hematoma.  As noted above.  She has a small mid inner lower lip 

contusion.  No suturable laceration.


Respiratory:  There are no retractions, lungs are clear to auscultation with 

good air movement bilaterally.  Chest wall is stable to AP and lateral 

palpation.


Cardiovascular:  Regular rate and rhythm.  No murmur.


Gastrointestinal:  Abdomen is soft and nontender, no masses, bowel sounds 

normal.


Neurological:  Motor sensory function is intact.  Cranial nerves are normal.  

Cerebellar function intact.


Skin:  Warm and dry, no rashes.  No lacerations, abrasions.  As above..


Musculoskeletal:  Neck is supple and nontender.  The trachea is midline.  No 

midline cervical, thoracic, lumbar or sacral tenderness on palpation.  No flank 

tenderness on palpation.


Bilateral upper extremity exam:  She has vague tenderness on palpation 

involving the index finger and 2nd metacarpal phalangeal joint of both hands.  

She also has vague tenderness on palpation of both elbows and both wrists.  She 

has discomfort with flexion and extension of the bilateral wrists and elbows 

passively and actively.  The upper extremities are neurovascularly intact.  No 

bony deformity was noted on palpation.  The skin is intact.  Extremities are 

symmetrical, full range of motion except noted.  All joints in the bilateral 

upper and bilateral lower extremities range without pain or impingement except 

noted.  No tenderness on palpation of the long bones in the bilateral upper and 

bilateral lower extremities except noted.


Psychiatric:  No agitation.  No depression.





Database:





EKG:





EKG time is 10:02 a.m.; EKG shows a narrow complex normal sinus verses ectopic 

atrial rhythm with borderline 1st degree AV block rhythm with a ventricular 

rate of 61. QS waves noted in lead V1 and V2.  The VA, QRS, QT intervals are 

within normal limits.  There are no ST-T wave changes indicative of ischemic or 

injury pattern.  No evidence of right heart strain.  Interpreted by me.





Imaging:





CT head without contrast:  Negative except for a possible subtle left-sided 

nasal bone fracture without displacement.  Results were discussed with staff 

radiologist Dr. Kishore Houser.





Right elbow, wrist, hand x-ray series:  Age-related changes only.  No evidence 

of fracture, subluxation, dislocation.  Interpreted by me.





Left elbow, wrist, hand x-ray series:  Age-related changes only.  No evidence 

of fracture, subluxation, dislocation.  Interpreted by me.





I have also reviewed the radiologist reports on these films.





Procedures:





Emergency department course:





Triage vital signs reviewed.  The patient is hypotensive and bradycardic.  She 

is afebrile.  She is on Coreg.  An IV was placed.  She was placed on a cardiac 

monitor.  She was started on IV normal saline with 500 cc to be given over the 

next hour.  EKG obtained and reviewed by myself.  She will be sent for CT 

imaging of her head an x-ray imaging of her upper extremities.  On my 

evaluation vital signs, blood pressure 116/57 with a heart rate of 80, narrow 

complex sinus rhythm on the monitor.





11:30 a.m., the patient is requesting more pain medication.  She was given 0.5 

mg of IV hydromorphone.





11:50 a.m., the patient was re-evaluated.  Her pain is better controlled.  

Results of her CT scans, EKG and plain film x-rays discussed with her and her 

.  She will be provided with a Velcro bilateral wrist splints.  We will 

get her up and road test her shortly to assess her readiness for discharge.





12:50 p.m., the patient has been up and ambulatory with her baseline gait.  She 

feels comfortable going home at this time.  Her  feels comfortable 

taking her home.  She has been fitted with bilateral Velcro wrist splints.  

Follow-up and return to emergency department precautions have been reviewed 

with her.  All of her questions were answered.  The patient was discharged home 

in good condition with her  who is driving.





Differential Diagnosis:





The differential diagnosis on this patient includes but is not limited to 

mechanical fall, nasal bridge fracture, upper extremity fracture.  Cervical 

spine fracture subluxation dislocation, traumatic brain injury unlikely.  This 

represents a partial list of diagnoses considered.  These considerations are 

based on history, physical exam, past history, reassessment and diagnostic 

testing.


Smoking Status: Never smoked


Constitutional: 


 Initial Vital Signs











Temperature (C)  36.5 C   10/30/18 09:37


 


Heart Rate  52 L  10/30/18 09:37


 


Respiratory Rate  18   10/30/18 09:37


 


Blood Pressure  88/55 L  10/30/18 09:37


 


O2 Sat (%)  95   10/30/18 09:37








 











O2 Delivery Mode               Room Air














Allergies/Adverse Reactions: 


 





Sulfa (Sulfonamide Antibiotics) Allergy (Intermediate, Verified 07/03/17 11:23)


 








Home Medications: 














 Medication  Instructions  Recorded


 


Ramipril [Altace 2.5mg (*)] 2.5 mg PO HS 04/14/12


 


clonazePAM [Klonopin (*)] 0.5 mg PO HS 04/14/12


 


Atorvastatin Calcium [Lipitor 40 40 mg PO DAILY 11/11/13





mg (*)]  


 


Melatonin [Melatonin 3 MG (*)] 3 mg PO HS 11/11/13


 


Omeprazole [Prilosec 20 mg] 20 mg PO DAILY PRN 11/11/13


 


Aspirin EC [Aspirin EC 81 mg (*)] 81 mg PO DAILY 03/30/17


 


Escitalopram Oxalate [Lexapro 10 10 mg PO DAILY 03/30/17





MG]  


 


Multivitamins [Multivitamin (*)] 1 each PO DAILY 03/30/17


 


Carvedilol [Coreg (*)] 12.5 mg PO DAILY #60 tab 04/01/17


 


Clopidogrel Bisulfate [Plavix (*)] 75 mg PO DAILY #30 tab 04/01/17


 


Multivitamins [Multivitamin (*)] 1 each PO DAILY #0 tab 04/01/17














Medical Decision Making





- Diagnostics


Imaging Results: 


 Imaging Impressions





Head CT  10/30/18 10:07


Impression:


1. Mild cerebral atrophy.


2. Moderate microvascular ischemic gliosis.


3. Cerebrovascular atherosclerosis.


4. No acute hemorrhage.


5. Nasal bone fracture.


 


Findings and recommendations discussed with Emergency Department physician, Dr. Laughlin McCollester at 1045 hours on October 30, 2018.


 


Final report concurs with initial preliminary interpretation.


 


 








Elbow X-Ray  10/30/18 10:08


Impression:


1. No acute osseous at about is seen right and left elbows.


2. Mild spurring along the lateral aspect of the elbow joint bilaterally.


3. Incidental focal cortical erosion along the lateral aspect of the distal 

lateral humeral epicondyles on the right.








Elbow X-Ray  10/30/18 10:08


Impression:


1. No acute osseous at about is seen right and left elbows.


2. Mild spurring along the lateral aspect of the elbow joint bilaterally.


3. Incidental focal cortical erosion along the lateral aspect of the distal 

lateral humeral epicondyles on the right.








Hand X-Ray  10/30/18 10:08


Impression: 


1. No acute osseous findings.


2. Osteopenia.


3. Multifocal osteoarthritis, most severe at the first carpometacarpal joints.


 


 








Hand X-Ray  10/30/18 10:08


Impression: 


1. No acute osseous findings.


2. Osteopenia.


3. Multifocal osteoarthritis, most severe at the first carpometacarpal joints.


 


 








Wrist X-Ray  10/30/18 10:08


Impression: No acute osseous findings.


 


 








Wrist X-Ray  10/30/18 10:08


Impression: No acute osseous findings.


 


 














- Data Points


Laboratory Results: 


 Laboratory Results





 10/30/18 10:00 





 10/30/18 10:00 





 











  10/30/18 10/30/18 10/30/18





  10:04 10:00 10:00


 


WBC      





    


 


RBC      





    


 


Hgb      





    


 


Hct      





    


 


MCV      





    


 


MCH      





    


 


MCHC      





    


 


RDW      





    


 


Plt Count      





    


 


MPV      





    


 


Neut % (Auto)      





    


 


Lymph % (Auto)      





    


 


Mono % (Auto)      





    


 


Eos % (Auto)      





    


 


Baso % (Auto)      





    


 


Nucleat RBC Rel Count      





    


 


Absolute Neuts (auto)      





    


 


Absolute Lymphs (auto)      





    


 


Absolute Monos (auto)      





    


 


Absolute Eos (auto)      





    


 


Absolute Basos (auto)      





    


 


Absolute Nucleated RBC      





    


 


Immature Gran %      





    


 


Immature Gran #      





    


 


PT      14.1 SEC SEC





     (12.0-15.0) 


 


INR      1.07 





     (0.83-1.16) 


 


APTT      30.3 SEC SEC





     (23.0-38.0) 


 


Sodium    140 mEq/L mEq/L  





    (135-145)  


 


Potassium    4.9 mEq/L mEq/L  





    (3.3-5.0)  


 


Chloride    106 mEq/L mEq/L  





    ()  


 


Carbon Dioxide    24 mEq/l mEq/l  





    (22-31)  


 


Anion Gap    10 mEq/L mEq/L  





    (6-14)  


 


BUN    15 mg/dL mg/dL  





    (7-23)  


 


Creatinine    1.0 mg/dL mg/dL  





    (0.6-1.0)  


 


Estimated GFR    53   





    


 


Glucose    126 mg/dL H mg/dL  





    ()  


 


Calcium    9.5 mg/dL mg/dL  





    (8.5-10.4)  


 


POC Troponin I  0.01 ng/mL ng/mL    





   (0.00-0.08)   


 


Ethyl Alcohol    < 10 mg/dL mg/dL  





    (0-10)  














  10/30/18





  10:00


 


WBC  6.83 10^3/uL 10^3/uL





   (3.80-9.50) 


 


RBC  3.75 10^6/uL L 10^6/uL





   (4.18-5.33) 


 


Hgb  12.1 g/dL L g/dL





   (12.6-16.3) 


 


Hct  36.6 % L %





   (38.0-47.0) 


 


MCV  97.6 fL fL





   (81.5-99.8) 


 


MCH  32.3 pg pg





   (27.9-34.1) 


 


MCHC  33.1 g/dL g/dL





   (32.4-36.7) 


 


RDW  13.8 % %





   (11.5-15.2) 


 


Plt Count  241 10^3/uL 10^3/uL





   (150-400) 


 


MPV  10.6 fL fL





   (8.7-11.7) 


 


Neut % (Auto)  70.4 % %





   (39.3-74.2) 


 


Lymph % (Auto)  16.5 % %





   (15.0-45.0) 


 


Mono % (Auto)  9.4 % %





   (4.5-13.0) 


 


Eos % (Auto)  3.2 % %





   (0.6-7.6) 


 


Baso % (Auto)  0.4 % %





   (0.3-1.7) 


 


Nucleat RBC Rel Count  0.0 % %





   (0.0-0.2) 


 


Absolute Neuts (auto)  4.80 10^3/uL 10^3/uL





   (1.70-6.50) 


 


Absolute Lymphs (auto)  1.13 10^3/uL 10^3/uL





   (1.00-3.00) 


 


Absolute Monos (auto)  0.64 10^3/uL 10^3/uL





   (0.30-0.80) 


 


Absolute Eos (auto)  0.22 10^3/uL 10^3/uL





   (0.03-0.40) 


 


Absolute Basos (auto)  0.03 10^3/uL 10^3/uL





   (0.02-0.10) 


 


Absolute Nucleated RBC  0.00 10^3/uL 10^3/uL





   (0-0.01) 


 


Immature Gran %  0.1 % %





   (0.0-1.1) 


 


Immature Gran #  0.01 10^3/uL 10^3/uL





   (0.00-0.10) 


 


PT  





  


 


INR  





  


 


APTT  





  


 


Sodium  





  


 


Potassium  





  


 


Chloride  





  


 


Carbon Dioxide  





  


 


Anion Gap  





  


 


BUN  





  


 


Creatinine  





  


 


Estimated GFR  





  


 


Glucose  





  


 


Calcium  





  


 


POC Troponin I  





  


 


Ethyl Alcohol  





  











Medications Given: 


 








Discontinued Medications





Hydromorphone HCl (Dilaudid)  0.25 mg IVP EDNOW ONE


   Stop: 10/30/18 10:40


   Last Admin: 10/30/18 10:43 Dose:  0.25 mg


Hydromorphone HCl (Dilaudid)  0.5 mg IVP EDNOW ONE


   Stop: 10/30/18 11:29


   Last Admin: 10/30/18 11:43 Dose:  0.5 mg


Sodium Chloride (Ns)  500 mls @ 0 mls/hr IV ONCE ONE; Wide Open


   PRN Reason: Protocol


   Stop: 10/30/18 10:07


   Last Admin: 10/30/18 10:26 Dose:  500 mls





Point of Care Test Results: 


 Chemistry











  10/30/18





  10:04


 


POC Troponin I  0.01 ng/mL ng/mL





   (0.00-0.08) 














Departure





- Departure


Disposition: Home, Routine, Self-Care


Clinical Impression: 


 Fall from ground level, Nasal contusion, Nasal bone fracture, Strain of wrist, 

bilateral





Condition: Good


Instructions:  Head Injury (ED), Fall Prevention (ED), Wrist Sprain (ED)


Additional Instructions: 


Read and follow provided instructions.





Follow-up with your primary care physician in 1-2 days for re-evaluation.





Continue taking your medication as prescribed.





Return to the emergency department for worsening pain, lightheadedness, 

confusion, vomiting, worsening headache or other serious concerns.


Referrals: 


Camilla Leyva MD [Primary Care Provider] - As per Instructions

## 2018-10-30 NOTE — CPEKG
Test Reason : OPEN

Blood Pressure : ***/*** mmHG

Vent. Rate : 061 BPM     Atrial Rate : 062 BPM

   P-R Int : 145 ms          QRS Dur : 089 ms

    QT Int : 490 ms       P-R-T Axes : -53 037 028 degrees

   QTc Int : 494 ms

 

Sinus or ectopic atrial rhythm

Anteroseptal infarct, old

 

Confirmed by McCollester, Laughlin ([id number]) on 10/30/2018 3:03:28 PM

 

Referred By:             Confirmed By:Laughlin McCollester